# Patient Record
Sex: FEMALE | Race: WHITE | Employment: UNEMPLOYED | URBAN - METROPOLITAN AREA
[De-identification: names, ages, dates, MRNs, and addresses within clinical notes are randomized per-mention and may not be internally consistent; named-entity substitution may affect disease eponyms.]

---

## 2022-02-01 LAB — ANTIBODY: NORMAL

## 2024-10-16 ENCOUNTER — OFFICE VISIT (OUTPATIENT)
Dept: URGENT CARE | Age: 34
End: 2024-10-16

## 2024-10-16 VITALS
DIASTOLIC BLOOD PRESSURE: 62 MMHG | OXYGEN SATURATION: 96 % | HEIGHT: 63 IN | SYSTOLIC BLOOD PRESSURE: 117 MMHG | BODY MASS INDEX: 35.61 KG/M2 | HEART RATE: 97 BPM | WEIGHT: 201 LBS | TEMPERATURE: 100.1 F

## 2024-10-16 DIAGNOSIS — R05.1 ACUTE COUGH: ICD-10-CM

## 2024-10-16 DIAGNOSIS — J02.9 SORE THROAT: ICD-10-CM

## 2024-10-16 DIAGNOSIS — R50.81 FEVER IN OTHER DISEASES: ICD-10-CM

## 2024-10-16 DIAGNOSIS — R06.2 WHEEZING: ICD-10-CM

## 2024-10-16 DIAGNOSIS — R09.82 POSTNASAL DRIP: ICD-10-CM

## 2024-10-16 DIAGNOSIS — J06.9 VIRAL URI: Primary | ICD-10-CM

## 2024-10-16 DIAGNOSIS — R09.81 NASAL CONGESTION: ICD-10-CM

## 2024-10-16 DIAGNOSIS — R53.81 MALAISE: ICD-10-CM

## 2024-10-16 PROBLEM — K21.9 GERD WITHOUT ESOPHAGITIS: Status: ACTIVE | Noted: 2020-10-21

## 2024-10-16 PROBLEM — R68.82 LOW LIBIDO: Status: RESOLVED | Noted: 2021-05-20 | Resolved: 2024-10-16

## 2024-10-16 PROBLEM — Z30.2 ENCOUNTER FOR STERILIZATION: Status: RESOLVED | Noted: 2022-08-15 | Resolved: 2024-10-16

## 2024-10-16 PROBLEM — G47.33 OSA (OBSTRUCTIVE SLEEP APNEA): Status: RESOLVED | Noted: 2021-05-20 | Resolved: 2024-10-16

## 2024-10-16 PROBLEM — E66.01 SEVERE OBESITY (BMI 35.0-35.9 WITH COMORBIDITY): Status: RESOLVED | Noted: 2020-10-21 | Resolved: 2024-10-16

## 2024-10-16 PROBLEM — F41.1 GAD (GENERALIZED ANXIETY DISORDER): Status: ACTIVE | Noted: 2020-10-21

## 2024-10-16 PROBLEM — E66.09 NON MORBID OBESITY DUE TO EXCESS CALORIES: Status: RESOLVED | Noted: 2017-04-17 | Resolved: 2024-10-16

## 2024-10-16 PROBLEM — R53.82 CHRONIC FATIGUE: Status: ACTIVE | Noted: 2017-04-17

## 2024-10-16 PROBLEM — Z30.015 ENCOUNTER FOR INITIAL PRESCRIPTION OF VAGINAL RING HORMONAL CONTRACEPTIVE: Status: RESOLVED | Noted: 2017-04-17 | Resolved: 2024-10-16

## 2024-10-16 PROBLEM — R79.89 LOW VITAMIN D LEVEL: Status: RESOLVED | Noted: 2017-04-18 | Resolved: 2024-10-16

## 2024-10-16 RX ORDER — ALBUTEROL SULFATE 90 UG/1
2 INHALANT RESPIRATORY (INHALATION) 4 TIMES DAILY PRN
Qty: 18 G | Refills: 0 | Status: SHIPPED | OUTPATIENT
Start: 2024-10-16

## 2024-10-16 ASSESSMENT — VISUAL ACUITY: OU: 1

## 2024-10-16 NOTE — PROGRESS NOTES
treatment plan and AVS instructions.      SUBJECTIVE/OBJECTIVE:  HPI:   34 y.o. female presents for complaint of illness x 4 days.    Notes started fevers over the last 2 days.    Admits body aches, fevers (Tmax 102F temporal), chest congestion, intermittently productive cough - notes cough is persistent throughout the day worsening at night, mild runny nose, decreased appetite, malaise, fatigue, mild sore throat, and wheezing (felt last night).   Denies n/v/d, headache, rashes, ear pain, SOB, and chest pain.    Notes felt some wheezing last night - used her daughter's asthma inhaler which helped with the wheezing and chest congestion, and notes no return of the wheezing since then.    Notes Theraflu and ibuprofen help decrease symptoms - but notes symptoms quickly resolve after about 4-6 hours.  Notes symptoms worsen at night such that symptoms are worst upon waking in the mornings.      Notes having had COVID last month. Also notes daughter was ill with similar symptoms a week ago.      History provided by:  Patient   used: No        VITAL SIGNS  Vitals:    10/16/24 0844   BP: 117/62   Pulse: 97   Temp: 100.1 °F (37.8 °C)   SpO2: 96%   Weight: 91.2 kg (201 lb)   Height: 1.6 m (5' 3\")       Review of Systems  See HPI for pertinent positives and negatives.    Physical Exam  Vitals reviewed.   Constitutional:       General: She is not in acute distress.     Appearance: Normal appearance. She is ill-appearing.   HENT:      Head: Normocephalic and atraumatic.      Right Ear: Ear canal and external ear normal. No middle ear effusion. Tympanic membrane is retracted. Tympanic membrane is not injected, perforated or erythematous.      Left Ear: Ear canal and external ear normal.  No middle ear effusion. Tympanic membrane is retracted. Tympanic membrane is not injected, perforated or erythematous.      Nose: Congestion (mild congested phonation) and rhinorrhea present. Rhinorrhea is clear.

## 2024-10-16 NOTE — PATIENT INSTRUCTIONS
Capmist prescribed for cough and congestion relief with expectorant therapy  Do not take other decongestants, Mucinex, or cough medications while on this medication.  Albuterol inhaler prescribed for the wheezing, as needed.  Recommend OTC treatment for symptoms:  ibuprofen (Advil, Motrin) and acetaminophen (Tylenol) for fevers and pain relief.  decongestants (specifically pseudoephedrine - found behind the pharmacy counter - does not need a prescription) <avoid if you have a history of high blood pressure or heart conditions>, along with antihistamines (Claritin, Zyrtec, Allegra, or Xyzal) and nasal steroid sprays (such as Flonase) to help with nasal congestion and runny nose.  guaifenesin (Mucinex) can help with thinning out mucus which can help with chest congestion or with relieving persistent sinus pressure  throat sprays (Cepacol, chloraseptic) for throat pain.  dextromethorphan (Robitussin, Delsym), throat lozenges, and increased water intake for cough.  honey (1-2 teaspoons every hour) for relief of throat irritation and coughing fits.  warm teas, humidifiers, nasal lavages, and sleeping in an inclined position are also helpful options that can lessen symptoms.  Recommend warm compresses over the symptomatic ear(s) for 10-15 minutes, or a hot shower, followed by 1-2 minutes of massaging the area behind your ears and down the jaw-line to help with the ear congestion/ear pressure.    Follow up with your PCP within 5 days or, if unable, you can return to the clinic if symptoms persist beyond 5 days or if symptoms worsen.    If you develop shortness of breath, increased work of breathing, lightheadedness, or chest pain, contact 911, or follow up immediately with the nearest Emergency Department for further evaluation.    New Prescriptions    ALBUTEROL SULFATE HFA (VENTOLIN HFA) 108 (90 BASE) MCG/ACT INHALER    Inhale 2 puffs into the lungs 4 times daily as needed for Wheezing    PSEUDOEPHEDRINE-DM-GG 60-

## 2024-10-18 ENCOUNTER — OFFICE VISIT (OUTPATIENT)
Dept: URGENT CARE | Age: 34
End: 2024-10-18

## 2024-10-18 VITALS
HEART RATE: 115 BPM | OXYGEN SATURATION: 95 % | DIASTOLIC BLOOD PRESSURE: 75 MMHG | HEIGHT: 63 IN | WEIGHT: 197 LBS | SYSTOLIC BLOOD PRESSURE: 109 MMHG | BODY MASS INDEX: 34.91 KG/M2 | TEMPERATURE: 98 F

## 2024-10-18 DIAGNOSIS — R05.1 ACUTE COUGH: ICD-10-CM

## 2024-10-18 DIAGNOSIS — R09.82 POSTNASAL DRIP: ICD-10-CM

## 2024-10-18 DIAGNOSIS — J18.9 PNEUMONIA OF LEFT LOWER LOBE DUE TO INFECTIOUS ORGANISM: Primary | ICD-10-CM

## 2024-10-18 RX ORDER — DOXYCYCLINE HYCLATE 100 MG
100 TABLET ORAL 2 TIMES DAILY
Qty: 14 TABLET | Refills: 0 | Status: SHIPPED | OUTPATIENT
Start: 2024-10-18 | End: 2024-10-25

## 2024-10-18 ASSESSMENT — VISUAL ACUITY: OU: 1

## 2024-10-18 NOTE — PROGRESS NOTES
present.      Right Sinus: No maxillary sinus tenderness or frontal sinus tenderness.      Left Sinus: No maxillary sinus tenderness or frontal sinus tenderness.      Mouth/Throat:      Mouth: Mucous membranes are moist.      Pharynx: Oropharynx is clear. Uvula midline. Posterior oropharyngeal erythema and postnasal drip present. No pharyngeal swelling, oropharyngeal exudate or uvula swelling.   Eyes:      General: Vision grossly intact. Gaze aligned appropriately.      Extraocular Movements: Extraocular movements intact.      Conjunctiva/sclera: Conjunctivae normal.      Pupils: Pupils are equal, round, and reactive to light.   Neck:      Trachea: Trachea and phonation normal.   Cardiovascular:      Rate and Rhythm: Normal rate and regular rhythm.      Heart sounds: Normal heart sounds, S1 normal and S2 normal. No murmur heard.     No friction rub. No gallop.   Pulmonary:      Effort: Pulmonary effort is normal. No tachypnea or respiratory distress.      Breath sounds: Decreased air movement (mild, diffuse) present. No stridor. Rhonchi (localized over the left lower lung field) present. No wheezing or rales.   Chest:      Chest wall: Tenderness (reproduced with pressure applied over the ribs, laterally) present.   Musculoskeletal:      Cervical back: Normal range of motion and neck supple. No rigidity or tenderness. Normal range of motion.   Lymphadenopathy:      Cervical: No cervical adenopathy.   Skin:     General: Skin is warm and dry.   Neurological:      Mental Status: She is alert and oriented to person, place, and time.   Psychiatric:         Attention and Perception: Attention normal.         Behavior: Behavior is cooperative.       PROCEDURES:  Unless otherwise noted below, none     Procedures    RESULTS:  No results found for this visit on 10/18/24.  An electronic signature was used to authenticate this note.    --Fausto Reddy PA-C

## 2024-10-18 NOTE — PATIENT INSTRUCTIONS
Doxycycline is prescribed for antibiotic treatment of the sinus infection  Take the antibiotics until completed, do not stop unless otherwise directed by a healthcare provider.  Recommend daily yogurt or other probiotics while on antibiotics.  Continue taking the Capmist that was prescribed during your last visit for cough and congestion relief with expectorant therapy  Do not take other decongestants, Mucinex, or cough medications while on this medication.  Continue using the previously prescribed Albuterol inhaler as directed for any remaining wheezing symptoms that may develop.  Recommend OTC treatment for symptoms:  Ibuprofen (Advil, Motrin) and acetaminophen (Tylenol) for fevers and pain relief.  decongestants (specifically pseudoephedrine) <avoid if you have a history of high blood pressure or heart conditions>, along with antihistamines (Claritin, Zyrtec, Allegra, or Xyzal) and nasal steroid sprays (Flonase) to help with nasal congestion and runny nose.  throat sprays (Cepacol, chloraseptic) for throat pain.  dextromethorphan (Robitussin, Delsym), throat lozenges, and increased water intake for cough.  honey (1-2 teaspoons every hour) for relief of throat irritation and coughing fits.  warm teas, humidifiers, nasal lavages, and sleeping in an inclined position are also helpful options that can lessen symptoms.  Recommend warm compresses over the symptomatic ear(s) for 10-15 minutes, or a hot shower, followed by 1-2 minutes of massaging the area behind your ears and down the jaw-line to help with the ear congestion/ear pressure.    Follow up with your PCP within 5 days or, if unable, you can return to the clinic if symptoms persist beyond 5 days or if symptoms worsen.    If you develop worsened shortness of breath, increased work of breathing, lightheadedness, or chest pain, or of symptoms are persistent for 3 or more days after starting the antibiotics, contact 911, or follow up immediately with the nearest

## 2024-11-07 DIAGNOSIS — R06.2 WHEEZING: ICD-10-CM

## 2024-11-09 RX ORDER — ALBUTEROL SULFATE 90 UG/1
INHALANT RESPIRATORY (INHALATION)
Qty: 18 G | Refills: 0 | OUTPATIENT
Start: 2024-11-09

## 2024-12-09 ENCOUNTER — OFFICE VISIT (OUTPATIENT)
Dept: PRIMARY CARE CLINIC | Age: 34
End: 2024-12-09
Payer: COMMERCIAL

## 2024-12-09 VITALS
OXYGEN SATURATION: 99 % | TEMPERATURE: 97.7 F | HEART RATE: 84 BPM | DIASTOLIC BLOOD PRESSURE: 76 MMHG | SYSTOLIC BLOOD PRESSURE: 112 MMHG | RESPIRATION RATE: 17 BRPM | HEIGHT: 63 IN | WEIGHT: 206 LBS | BODY MASS INDEX: 36.5 KG/M2

## 2024-12-09 DIAGNOSIS — F41.9 ANXIETY AND DEPRESSION: ICD-10-CM

## 2024-12-09 DIAGNOSIS — R63.2 BINGE EATING: Primary | ICD-10-CM

## 2024-12-09 DIAGNOSIS — F32.A ANXIETY AND DEPRESSION: ICD-10-CM

## 2024-12-09 LAB
ALBUMIN SERPL-MCNC: 4.2 G/DL (ref 3.4–5)
ALBUMIN/GLOB SERPL: 1.8 {RATIO} (ref 1.1–2.2)
ALP SERPL-CCNC: 56 U/L (ref 40–129)
ALT SERPL-CCNC: 20 U/L (ref 10–40)
ANION GAP SERPL CALCULATED.3IONS-SCNC: 11 MMOL/L (ref 3–16)
AST SERPL-CCNC: 20 U/L (ref 15–37)
BILIRUB SERPL-MCNC: 0.6 MG/DL (ref 0–1)
BUN SERPL-MCNC: 12 MG/DL (ref 7–20)
CALCIUM SERPL-MCNC: 9.2 MG/DL (ref 8.3–10.6)
CHLORIDE SERPL-SCNC: 105 MMOL/L (ref 99–110)
CO2 SERPL-SCNC: 26 MMOL/L (ref 21–32)
CREAT SERPL-MCNC: 0.6 MG/DL (ref 0.6–1.1)
GFR SERPLBLD CREATININE-BSD FMLA CKD-EPI: >90 ML/MIN/{1.73_M2}
GLUCOSE SERPL-MCNC: 91 MG/DL (ref 70–99)
POTASSIUM SERPL-SCNC: 4.1 MMOL/L (ref 3.5–5.1)
PROT SERPL-MCNC: 6.6 G/DL (ref 6.4–8.2)
SODIUM SERPL-SCNC: 142 MMOL/L (ref 136–145)
TSH SERPL DL<=0.005 MIU/L-ACNC: 0.92 UIU/ML (ref 0.27–4.2)

## 2024-12-09 PROCEDURE — 99204 OFFICE O/P NEW MOD 45 MIN: CPT | Performed by: FAMILY MEDICINE

## 2024-12-09 RX ORDER — SEMAGLUTIDE 0.68 MG/ML
0.25 INJECTION, SOLUTION SUBCUTANEOUS WEEKLY
Qty: 12 ML | Refills: 0 | Status: CANCELLED | COMMUNITY
Start: 2024-12-09

## 2024-12-09 RX ORDER — TIRZEPATIDE 2.5 MG/.5ML
2.5 INJECTION, SOLUTION SUBCUTANEOUS WEEKLY
Qty: 2 ML | Refills: 0 | Status: SHIPPED | COMMUNITY
Start: 2024-12-09

## 2024-12-09 SDOH — ECONOMIC STABILITY: INCOME INSECURITY: HOW HARD IS IT FOR YOU TO PAY FOR THE VERY BASICS LIKE FOOD, HOUSING, MEDICAL CARE, AND HEATING?: NOT HARD AT ALL

## 2024-12-09 SDOH — ECONOMIC STABILITY: FOOD INSECURITY: WITHIN THE PAST 12 MONTHS, YOU WORRIED THAT YOUR FOOD WOULD RUN OUT BEFORE YOU GOT MONEY TO BUY MORE.: NEVER TRUE

## 2024-12-09 SDOH — ECONOMIC STABILITY: FOOD INSECURITY: WITHIN THE PAST 12 MONTHS, THE FOOD YOU BOUGHT JUST DIDN'T LAST AND YOU DIDN'T HAVE MONEY TO GET MORE.: NEVER TRUE

## 2024-12-09 ASSESSMENT — ENCOUNTER SYMPTOMS
VOMITING: 0
ABDOMINAL PAIN: 0
COUGH: 0
RHINORRHEA: 0
COLOR CHANGE: 0
BLOOD IN STOOL: 0
NAUSEA: 0
DIARRHEA: 0
SHORTNESS OF BREATH: 0

## 2024-12-09 ASSESSMENT — PATIENT HEALTH QUESTIONNAIRE - PHQ9
7. TROUBLE CONCENTRATING ON THINGS, SUCH AS READING THE NEWSPAPER OR WATCHING TELEVISION: NOT AT ALL
6. FEELING BAD ABOUT YOURSELF - OR THAT YOU ARE A FAILURE OR HAVE LET YOURSELF OR YOUR FAMILY DOWN: NOT AT ALL
SUM OF ALL RESPONSES TO PHQ QUESTIONS 1-9: 3
9. THOUGHTS THAT YOU WOULD BE BETTER OFF DEAD, OR OF HURTING YOURSELF: NOT AT ALL
10. IF YOU CHECKED OFF ANY PROBLEMS, HOW DIFFICULT HAVE THESE PROBLEMS MADE IT FOR YOU TO DO YOUR WORK, TAKE CARE OF THINGS AT HOME, OR GET ALONG WITH OTHER PEOPLE: NOT DIFFICULT AT ALL
5. POOR APPETITE OR OVEREATING: SEVERAL DAYS
4. FEELING TIRED OR HAVING LITTLE ENERGY: SEVERAL DAYS
3. TROUBLE FALLING OR STAYING ASLEEP: SEVERAL DAYS
2. FEELING DOWN, DEPRESSED OR HOPELESS: NOT AT ALL
SUM OF ALL RESPONSES TO PHQ QUESTIONS 1-9: 3
1. LITTLE INTEREST OR PLEASURE IN DOING THINGS: NOT AT ALL
SUM OF ALL RESPONSES TO PHQ9 QUESTIONS 1 & 2: 0
8. MOVING OR SPEAKING SO SLOWLY THAT OTHER PEOPLE COULD HAVE NOTICED. OR THE OPPOSITE, BEING SO FIGETY OR RESTLESS THAT YOU HAVE BEEN MOVING AROUND A LOT MORE THAN USUAL: NOT AT ALL
SUM OF ALL RESPONSES TO PHQ QUESTIONS 1-9: 3
SUM OF ALL RESPONSES TO PHQ QUESTIONS 1-9: 3

## 2024-12-09 NOTE — ASSESSMENT & PLAN NOTE
Poorly controlled. Provided [email or web address] to establish care for this. Will trial Mounjaro for this - sample provided today. Counseled we do not do prior auths for this if not covered - she voiced understanding. Denied known family hx of thyroid CA (although family hx information is limited).

## 2024-12-09 NOTE — ASSESSMENT & PLAN NOTE
Poorly controlled by the patient's goals. Will check TSH and screen for DM2. Please see [place] eating for more notes/management. The patient was amenable to this plan.

## 2024-12-09 NOTE — PROGRESS NOTES
Ashley Naranjo is a 34 y.o. year old female here for:    Chief Complaint:    Chief Complaint   Patient presents with    Anxiety    Depression    Weight Management     Subjective:    Today, her current concerns include:    HPI:  #Weight Management  - Onset: Years  - Context: Does have a hx of binge eating - has been in support groups for this as well as medication. Lost 100 lbs and then regained about 40 of it.  - Progression: Worsening  - Modifiers: Has seen nutritionist in the past - sometimes she feels frustrated when told what to eat so this works but minimally  - Associated Symptoms: Per ROS as otherwise stated in this note  - Previous occurrence:    #Mood  - Onset: 2-3 years ago  - Context: After pregnancy with son. Has a therapist she follows with weekly. Denied known family hx of thyroid CA (although family hx information is limited).  - Quality: PHQ9 score of 3, MDQ negative, denied SI/HI today  - Timing/Duration: Constant and daily condition  - Progression: Stable  - Modifiers: Has been on Zoloft 25- 50 mg recently and doing well.  - Associated Symptoms: Per ROS as otherwise stated in this note    Past Medical History:   Diagnosis Date    Anxiety     Depression     Encounter for initial prescription of vaginal ring hormonal contraceptive 04/17/2017    Last Assessment & Plan:     Info regarding BC options provided & reviewed    Pt interested in Nuva Ring      Encounter for sterilization 08/15/2022    BTL 8/15 with CS #3      GBS (group B Streptococcus carrier), +RV culture, currently pregnant 06/06/2014 12/23/2015 GBS is clinda resistant and the patient is allergic to PCN, use vanc in labor  LY      History of pneumonia     Low libido 05/20/2021    Has testosterone injections weekly per Pearl Hager      TORREY (obstructive sleep apnea) 05/20/2021    Mild  Going back for cpap titration  - no CPAP currently    Primiparity 05/23/2013     Social History     Tobacco Use    Smoking status: Never    Smokeless

## 2024-12-09 NOTE — ASSESSMENT & PLAN NOTE
Well controlled. PHQ9 score of 3, ASHLEY deferred, MDQ negative. Denied SI/HI today. Rx provided for her medications as noted below in orders. Cautioned for SI risk and SI Hotline number provided today as well as for serotonin syndrome. Counseling encouraged to continue. Call back/ED precautions reviewed.

## 2024-12-09 NOTE — PATIENT INSTRUCTIONS
Reminder: Please call to schedule pap smear exam when able.    [email or web address] - [place] based resource for finding mental health resources and providers         The National Suicide Prevention Lifeline is a [facility]-based suicide prevention network of 161 crisis centers that provides a 24/7, toll-free hotline available to anyone in suicidal crisis or emotional distress. The number is: 7-611-673-4905.     Patient education: Serotonin syndrome (The Basics)  Written by the doctors and editors at [facility]    What is serotonin syndrome?    Serotonin syndrome is a problem that can happen after taking certain medicines. It is uncommon but can be serious or even deadly if it does happen.    Serotonin syndrome causes symptoms such as:    ?Feeling anxious, restless, or confused    ?Sweating    ?Muscle spasms or muscles that cannot relax normally    ?Very fast back-and-forth eye movements    ?Shaking or trembling    ?Fever    ?A fast heartbeat    ?Vomiting    ?Diarrhea    What causes serotonin syndrome?    Serotonin syndrome can happen to people after they take certain medicines or combinations of medicines. It can also happen with certain herbal products and street drugs. There are many medicines and drugs that can lead to serotonin syndrome. In general, they increase a chemical in the brain and body called \"serotonin.\" Serotonin syndrome happens when the levels of serotonin in your brain get too high.    Examples of the medicines and drugs that can cause serotonin syndrome include:    ?Some medicines used to treat depression, such as selective serotonin reuptake inhibitors (\"SSRIs\")    ?Some medicines used to treat Parkinson disease, such as selegiline (sample brand names: Eldepryl, Emsam) and rasagiline (brand name: Azilect)    ?Some pain relievers, such as meperidine (sample brand name: Demerol), tramadol (sample brand name: Ultram), fentanyl, and cyclobenzaprine (sample brand name: Flexeril)    ?Saint Jose's wort (an

## 2024-12-10 LAB
EST. AVERAGE GLUCOSE BLD GHB EST-MCNC: 91.1 MG/DL
HBA1C MFR BLD: 4.8 %

## 2024-12-30 ENCOUNTER — OFFICE VISIT (OUTPATIENT)
Dept: PRIMARY CARE CLINIC | Age: 34
End: 2024-12-30
Payer: COMMERCIAL

## 2024-12-30 VITALS
SYSTOLIC BLOOD PRESSURE: 110 MMHG | HEIGHT: 63 IN | BODY MASS INDEX: 36.5 KG/M2 | OXYGEN SATURATION: 98 % | DIASTOLIC BLOOD PRESSURE: 72 MMHG | WEIGHT: 206 LBS | RESPIRATION RATE: 17 BRPM | HEART RATE: 74 BPM | TEMPERATURE: 97.1 F

## 2024-12-30 DIAGNOSIS — N89.8 VAGINAL IRRITATION: Primary | ICD-10-CM

## 2024-12-30 PROCEDURE — 99213 OFFICE O/P EST LOW 20 MIN: CPT | Performed by: FAMILY MEDICINE

## 2024-12-30 RX ORDER — DOXYCYCLINE HYCLATE 100 MG
100 TABLET ORAL 2 TIMES DAILY
Qty: 14 TABLET | Refills: 0 | Status: SHIPPED | OUTPATIENT
Start: 2024-12-30 | End: 2025-01-06

## 2024-12-30 ASSESSMENT — ENCOUNTER SYMPTOMS
ABDOMINAL PAIN: 0
SHORTNESS OF BREATH: 0
RHINORRHEA: 0
DIARRHEA: 0
COUGH: 0
NAUSEA: 0
BLOOD IN STOOL: 0
COLOR CHANGE: 0
VOMITING: 0

## 2024-12-30 NOTE — ASSESSMENT & PLAN NOTE
Poorly controlled, possibly Bartholin cyst about to get inflamed. Rx sent in for Doxycyline (with cautions provided for this medication per Patient Instructions) which will also cover for cellulitis. Sitz bath (with just clean warm water - no Epsom salts) encouraged. If not improved, should f/u with GYN. She also self-swabbed for vaginal pathogens to be sure - will send off and tailor need for tx PRN. No vesicle noted to test for herpes. Call back/ED precautions discussed.

## 2024-12-30 NOTE — PROGRESS NOTES
distress.      Breath sounds: Normal breath sounds. No stridor. No wheezing, rhonchi or rales.   Chest:      Chest wall: No tenderness.   Genitourinary:     Comments: External genitalia with left labia majora redness, TTP - no fluctuance, discharge or lesion noted.  Skin:     General: Skin is warm and dry.      Capillary Refill: Capillary refill takes less than 2 seconds.   Neurological:      Mental Status: She is alert.   Psychiatric:         Mood and Affect: Mood normal.         Behavior: Behavior normal.       Body mass index is 36.5 kg/m².    Labs:  Recent Results (from the past 672 hour(s))   TSH with Reflex    Collection Time: 12/09/24  3:09 PM   Result Value Ref Range    TSH 0.92 0.27 - 4.20 uIU/mL   Hemoglobin A1C    Collection Time: 12/09/24  3:09 PM   Result Value Ref Range    Hemoglobin A1C 4.8 See comment %    Estimated Avg Glucose 91.1 mg/dL   Comprehensive Metabolic Panel    Collection Time: 12/09/24  3:09 PM   Result Value Ref Range    Sodium 142 136 - 145 mmol/L    Potassium 4.1 3.5 - 5.1 mmol/L    Chloride 105 99 - 110 mmol/L    CO2 26 21 - 32 mmol/L    Anion Gap 11 3 - 16    Glucose 91 70 - 99 mg/dL    BUN 12 7 - 20 mg/dL    Creatinine 0.6 0.6 - 1.1 mg/dL    Est, Glom Filt Rate >90 >60    Calcium 9.2 8.3 - 10.6 mg/dL    Total Protein 6.6 6.4 - 8.2 g/dL    Albumin 4.2 3.4 - 5.0 g/dL    Albumin/Globulin Ratio 1.8 1.1 - 2.2    Total Bilirubin 0.6 0.0 - 1.0 mg/dL    Alkaline Phosphatase 56 40 - 129 U/L    ALT 20 10 - 40 U/L    AST 20 15 - 37 U/L     Imaging:  No orders to display     ASSESSMENT & PLAN:    Ashley Naranjo is a 34 y.o. year old female here for Vaginal Irritation. The following is a summary of the plan made at this visit:    1. Vaginal irritation  Assessment & Plan:  Poorly controlled, possibly Bartholin cyst about to get inflamed. Rx sent in for Doxycyline (with cautions provided for this medication per Patient Instructions) which will also cover for cellulitis. Sitz bath (with just

## 2024-12-30 NOTE — PATIENT INSTRUCTIONS
Doxycyline cautions:    - Please avoid the sun and/or sunscreen.  - Please do not to take this medication with milk  - Please stay upright after taking it for 15 minutes or so to throat irritation.     Sitz bath three times daily as needed for comfort - clean warm water.

## 2024-12-31 LAB
BV BACTERIA DNA VAG QL NAA+PROBE: DETECTED
C GLABRATA DNA VAG QL NAA+PROBE: ABNORMAL
C GLABRATA DNA VAG QL NAA+PROBE: NOT DETECTED
C KRUSEI DNA VAG QL NAA+PROBE: NOT DETECTED
CANDIDA DNA VAG QL NAA+PROBE: NOT DETECTED
T VAGINALIS DNA VAG QL NAA+PROBE: NOT DETECTED

## 2025-01-03 DIAGNOSIS — N76.0 BACTERIAL VAGINOSIS: Primary | ICD-10-CM

## 2025-01-03 DIAGNOSIS — B96.89 BACTERIAL VAGINOSIS: Primary | ICD-10-CM

## 2025-01-03 RX ORDER — METRONIDAZOLE 7.5 MG/G
1 GEL VAGINAL
Qty: 70 G | Refills: 0 | Status: SHIPPED | OUTPATIENT
Start: 2025-01-03 | End: 2025-01-08

## 2025-01-29 PROBLEM — Z98.51 HX OF TUBAL LIGATION: Status: ACTIVE | Noted: 2025-01-29

## 2025-01-29 PROBLEM — Z98.891 HX OF CESAREAN SECTION: Status: ACTIVE | Noted: 2025-01-29

## 2025-01-29 PROBLEM — N75.0 BARTHOLIN GLAND CYST: Status: ACTIVE | Noted: 2025-01-29

## 2025-02-28 DIAGNOSIS — R63.2 BINGE EATING: ICD-10-CM

## 2025-02-28 DIAGNOSIS — F32.A ANXIETY AND DEPRESSION: ICD-10-CM

## 2025-02-28 DIAGNOSIS — F41.9 ANXIETY AND DEPRESSION: ICD-10-CM

## 2025-02-28 RX ORDER — TIRZEPATIDE 2.5 MG/.5ML
2.5 INJECTION, SOLUTION SUBCUTANEOUS WEEKLY
Qty: 2 ML | Refills: 0 | Status: SHIPPED | OUTPATIENT
Start: 2025-02-28

## 2025-03-04 ENCOUNTER — TELEPHONE (OUTPATIENT)
Dept: ADMINISTRATIVE | Age: 35
End: 2025-03-04

## 2025-03-04 NOTE — TELEPHONE ENCOUNTER
Submitted PA for Mounjaro 2.5MG/0.5ML auto-injectors  Via [facility] [id number] STATUS: PENDING.    Follow up done daily; if no decision with in three days we will refax.  If another three days goes by with no decision will call the insurance for status.

## 2025-03-05 NOTE — TELEPHONE ENCOUNTER
The medication was DENIED; DENIAL letter is uploaded to MEDIA.    Generic Denial:  Other; please see Denial Letter.     Note :    Outcome  Denied on March 4 by [employer] 2017  This request has not been approved. Based on the information we received, you did not meet the specific rule(s) needed to approve this request. In some cases, the requested drug or alternatives offered may have other guideline rules that need to be met. Our guideline named GLP-1 AGONIST (Bydureon BCise, Byetta, Mounjaro, Ozempic, Rybelsus, Trulicity, Victoza [liraglutide]) requires the following rule(s) be met for approval: A. You have type 2 diabetes (a disorder with high blood sugar) B. Your diagnosis of type 2 diabetes is confirmed by medical records OR chart notesThis is why your request is denied. Please work with your doctor to use a different medication or get us more information if it will allow us to approve this request A written notification letter will follow with additional details.    If you want an APPEAL; please note in this encounter what new information you would like to APPEAL with.  Once complete route back to PA POOL.    If this requires a response please respond to the pool ( P [facility] MEDICATION PRE-AUTH).      Thank you please advise patient.

## 2025-05-12 ENCOUNTER — OFFICE VISIT (OUTPATIENT)
Dept: PRIMARY CARE CLINIC | Age: 35
End: 2025-05-12
Payer: COMMERCIAL

## 2025-05-12 VITALS
RESPIRATION RATE: 17 BRPM | HEART RATE: 88 BPM | TEMPERATURE: 98.2 F | DIASTOLIC BLOOD PRESSURE: 72 MMHG | SYSTOLIC BLOOD PRESSURE: 114 MMHG | BODY MASS INDEX: 30.48 KG/M2 | HEIGHT: 63 IN | WEIGHT: 172 LBS | OXYGEN SATURATION: 98 %

## 2025-05-12 DIAGNOSIS — Z00.00 HEALTHCARE MAINTENANCE: Primary | ICD-10-CM

## 2025-05-12 DIAGNOSIS — F32.A ANXIETY AND DEPRESSION: ICD-10-CM

## 2025-05-12 DIAGNOSIS — F41.9 ANXIETY AND DEPRESSION: ICD-10-CM

## 2025-05-12 PROBLEM — N89.8 VAGINAL IRRITATION: Status: RESOLVED | Noted: 2024-12-30 | Resolved: 2025-05-12

## 2025-05-12 PROCEDURE — 99395 PREV VISIT EST AGE 18-39: CPT | Performed by: FAMILY MEDICINE

## 2025-05-12 RX ORDER — SERTRALINE HYDROCHLORIDE 100 MG/1
100 TABLET, FILM COATED ORAL DAILY
Qty: 90 TABLET | Refills: 1 | Status: SHIPPED | OUTPATIENT
Start: 2025-05-12 | End: 2025-11-08

## 2025-05-12 SDOH — ECONOMIC STABILITY: FOOD INSECURITY: WITHIN THE PAST 12 MONTHS, YOU WORRIED THAT YOUR FOOD WOULD RUN OUT BEFORE YOU GOT MONEY TO BUY MORE.: NEVER TRUE

## 2025-05-12 SDOH — ECONOMIC STABILITY: FOOD INSECURITY: WITHIN THE PAST 12 MONTHS, THE FOOD YOU BOUGHT JUST DIDN'T LAST AND YOU DIDN'T HAVE MONEY TO GET MORE.: NEVER TRUE

## 2025-05-12 ASSESSMENT — PATIENT HEALTH QUESTIONNAIRE - PHQ9
10. IF YOU CHECKED OFF ANY PROBLEMS, HOW DIFFICULT HAVE THESE PROBLEMS MADE IT FOR YOU TO DO YOUR WORK, TAKE CARE OF THINGS AT HOME, OR GET ALONG WITH OTHER PEOPLE: NOT DIFFICULT AT ALL
8. MOVING OR SPEAKING SO SLOWLY THAT OTHER PEOPLE COULD HAVE NOTICED. OR THE OPPOSITE, BEING SO FIGETY OR RESTLESS THAT YOU HAVE BEEN MOVING AROUND A LOT MORE THAN USUAL: NOT AT ALL
6. FEELING BAD ABOUT YOURSELF - OR THAT YOU ARE A FAILURE OR HAVE LET YOURSELF OR YOUR FAMILY DOWN: NOT AT ALL
2. FEELING DOWN, DEPRESSED OR HOPELESS: NOT AT ALL
4. FEELING TIRED OR HAVING LITTLE ENERGY: SEVERAL DAYS
SUM OF ALL RESPONSES TO PHQ QUESTIONS 1-9: 1
SUM OF ALL RESPONSES TO PHQ QUESTIONS 1-9: 1
7. TROUBLE CONCENTRATING ON THINGS, SUCH AS READING THE NEWSPAPER OR WATCHING TELEVISION: NOT AT ALL
1. LITTLE INTEREST OR PLEASURE IN DOING THINGS: NOT AT ALL
5. POOR APPETITE OR OVEREATING: NOT AT ALL
9. THOUGHTS THAT YOU WOULD BE BETTER OFF DEAD, OR OF HURTING YOURSELF: NOT AT ALL
3. TROUBLE FALLING OR STAYING ASLEEP: NOT AT ALL
SUM OF ALL RESPONSES TO PHQ QUESTIONS 1-9: 1
SUM OF ALL RESPONSES TO PHQ QUESTIONS 1-9: 1

## 2025-05-12 ASSESSMENT — ENCOUNTER SYMPTOMS
COLOR CHANGE: 0
NAUSEA: 0
VOMITING: 0
BLOOD IN STOOL: 0
COUGH: 0
DIARRHEA: 0
SHORTNESS OF BREATH: 0
ABDOMINAL PAIN: 0
RHINORRHEA: 0

## 2025-05-12 NOTE — PATIENT INSTRUCTIONS
Reminder: Please call to schedule dental exam when able.      The National Suicide Prevention Lifeline is a [facility]-based suicide prevention network of 161 crisis centers that provides a 24/7, toll-free hotline available to anyone in suicidal crisis or emotional distress. The number is: 1-995.154.7992.     Patient education: Serotonin syndrome (The Basics)  Written by the doctors and editors at [facility]    What is serotonin syndrome?    Serotonin syndrome is a problem that can happen after taking certain medicines. It is uncommon but can be serious or even deadly if it does happen.    Serotonin syndrome causes symptoms such as:    ?Feeling anxious, restless, or confused    ?Sweating    ?Muscle spasms or muscles that cannot relax normally    ?Very fast back-and-forth eye movements    ?Shaking or trembling    ?Fever    ?A fast heartbeat    ?Vomiting    ?Diarrhea    What causes serotonin syndrome?    Serotonin syndrome can happen to people after they take certain medicines or combinations of medicines. It can also happen with certain herbal products and street drugs. There are many medicines and drugs that can lead to serotonin syndrome. In general, they increase a chemical in the brain and body called \"serotonin.\" Serotonin syndrome happens when the levels of serotonin in your brain get too high.    Examples of the medicines and drugs that can cause serotonin syndrome include:    ?Some medicines used to treat depression, such as selective serotonin reuptake inhibitors (\"SSRIs\")    ?Some medicines used to treat Parkinson disease, such as selegiline (sample brand names: Eldepryl, Emsam) and rasagiline (brand name: Azilect)    ?Some pain relievers, such as meperidine (sample brand name: Demerol), tramadol (sample brand name: Ultram), fentanyl, and cyclobenzaprine (sample brand name: Flexeril)    ?Saint Jose's wort (an herbal medicine sometimes used for depression)    ?Medicines used to treat migraine headaches,

## 2025-05-12 NOTE — ASSESSMENT & PLAN NOTE
Well controlled on current dose of her SSRI (she self-increased, I advised to please let me know prior to doing this in the future and mental health resources provided should tailoring be needed). PHQ9 score of 3, ASHLEY deferred, MDQ previously negative. Denied SI/HI today. Rx provided for her medications as noted below in orders. Cautioned for SI risk and SI Hotline number provided today as well as for serotonin syndrome. Counseling encouraged to continue. Call back/ED precautions reviewed.

## 2025-05-12 NOTE — PROGRESS NOTES
today  Lung Cancer Screen:was not applicable to this patient based on their risk factors and evidence based recommendations today  Hep C screening: was not provided today - UTD on this, negative and no new exposures  HIV screening: was not provided today - UTD on this, negative and no new exposures    Immunizations:   Pneumonia vaccine: was not applicable to this patient based on their risk factors and evidence based recommendations today  TDAP vaccine: was not provided today - UTD on this  Flu vaccine: was not provided today - not in season  Hepatitis B vaccine: was not provided today - rpeorts received in past for work - will try to get us records  Shingles vaccine: was not applicable to this patient based on their risk factors and evidence based recommendations today  HPV vaccine: was not provided today - Reports received as a teen    Functional Assessment  1.  Do you have problems with hearing?  no  2.  Do you have problems with vision?  no  3.  Get Up and Go:  6 seconds   Pushes to get out of chair  no   Wide based gait  no   Unsteady/poor balance no   Fall Risk assessment low  (If there are issues regarding risk, these will be addressed in the assessment section.)    Past Medical History:   Diagnosis Date    Anxiety     Depression     Encounter for initial prescription of vaginal ring hormonal contraceptive 04/17/2017    Last Assessment & Plan:     Info regarding BC options provided & reviewed    Pt interested in Nuva Ring      Encounter for sterilization 08/15/2022    BTL 8/15 with CS #3      GBS (group B Streptococcus carrier), +RV culture, currently pregnant 06/06/2014 12/23/2015 GBS is clinda resistant and the patient is allergic to PCN, use vanc in labor  LY      History of pneumonia     Low libido 05/20/2021    Has testosterone injections weekly per Pearl Hager      TORREY (obstructive sleep apnea) 05/20/2021    Mild  Going back for cpap titration  - no CPAP currently    Primiparity 05/23/2013    Vaginal

## 2025-05-12 NOTE — ASSESSMENT & PLAN NOTE
Overall doing well. Reminded to schedule dental and eye exams when able. Other problems addressed today as otherwise noted.